# Patient Record
Sex: MALE | Race: WHITE | NOT HISPANIC OR LATINO | Employment: FULL TIME | URBAN - METROPOLITAN AREA
[De-identification: names, ages, dates, MRNs, and addresses within clinical notes are randomized per-mention and may not be internally consistent; named-entity substitution may affect disease eponyms.]

---

## 2023-05-02 ENCOUNTER — OFFICE VISIT (OUTPATIENT)
Dept: URGENT CARE | Facility: CLINIC | Age: 38
End: 2023-05-02

## 2023-05-02 VITALS
DIASTOLIC BLOOD PRESSURE: 80 MMHG | BODY MASS INDEX: 24.5 KG/M2 | OXYGEN SATURATION: 98 % | SYSTOLIC BLOOD PRESSURE: 124 MMHG | HEART RATE: 67 BPM | HEIGHT: 71 IN | TEMPERATURE: 98 F | WEIGHT: 175 LBS

## 2023-05-02 DIAGNOSIS — T15.91XA FOREIGN BODY OF RIGHT EYE, INITIAL ENCOUNTER: Primary | ICD-10-CM

## 2023-05-02 PROCEDURE — 99214 PR OFFICE/OUTPT VISIT, EST, LEVL IV, 30-39 MIN: ICD-10-PCS | Mod: TIER,,, | Performed by: NURSE PRACTITIONER

## 2023-05-02 PROCEDURE — 99214 OFFICE O/P EST MOD 30 MIN: CPT | Mod: TIER,,, | Performed by: NURSE PRACTITIONER

## 2023-05-02 RX ORDER — BUSPIRONE HYDROCHLORIDE 10 MG/1
10 TABLET ORAL 3 TIMES DAILY
COMMUNITY

## 2023-05-02 NOTE — PROGRESS NOTES
"Subjective:      Patient ID: Angelica Aparicio is a 38 y.o. male.    Vitals:  height is 5' 11" (1.803 m) and weight is 79.4 kg (175 lb). His oral temperature is 97.8 °F (36.6 °C). His blood pressure is 124/80 and his pulse is 67. His oxygen saturation is 98%.     Chief Complaint: Eye Pain    This is a 38 y.o. male who presents today with a chief complaint of possible foreign body to eye.     Patient presents with Right Eye Pain x 1 day. Patient states he was grinding on metal and felt something go into his right eye. Pt has been experiencing redness, irritation and foreign body sensation. Pt reports that he was wearing protective eye wear.         Eye Pain   The right eye is affected. This is a new problem. The current episode started yesterday. The problem occurs constantly. The problem has been unchanged. The pain is at a severity of 8/10. The pain is moderate. Associated symptoms include eye redness.     Constitution: Negative.   Eyes:  Positive for foreign body in eye, eye pain and eye redness.    Objective:     Physical Exam   Constitutional: He is oriented to person, place, and time. He appears well-developed.   HENT:   Head: Normocephalic and atraumatic.   Ears:   Right Ear: External ear normal.   Left Ear: External ear normal.   Nose: Nose normal.   Mouth/Throat: Oropharynx is clear and moist.   Eyes: Conjunctivae, EOM and lids are normal. Pupils are equal, round, and reactive to light. No visual field deficit is present. Foreign body (dark colored spec noted to right Iris, at 2 o'clock position. superficial. mild injection noted) present in the right eye. vision grossly intact gaze aligned appropriately   Neck: Trachea normal and phonation normal. Neck supple.   Musculoskeletal: Normal range of motion.         General: Normal range of motion.   Neurological: He is alert and oriented to person, place, and time.   Skin: Skin is warm, dry and intact.   Psychiatric: His speech is normal and behavior is normal. " Judgment and thought content normal.   Nursing note and vitals reviewed.    Assessment:     1. Foreign body of right eye, initial encounter        Plan:       Foreign body of right eye, initial encounter          Medical Decision Making:   Urgent Care Management:  I applied 2 drops of tetracaine eye solution to right.  I cleansed right eye wash with normal saline eye wash.  I attempted to remove the visualized foreign body to iris with a saline soaked gauze with whiskey method.  Attempt was unsuccessful.  I have called [facility] and patient report to this clinic at 3:00 p.m. today for further evaluation and treatment by the eye doctor.       Santos Henderson, ADRIANNE-C

## 2023-05-02 NOTE — PATIENT INSTRUCTIONS
Report directly to eye Doctor appointment today at 3pm at [facility] for further evaluation and treatment.

## 2023-10-05 ENCOUNTER — HOSPITAL ENCOUNTER (EMERGENCY)
Facility: HOSPITAL | Age: 38
Discharge: HOME | End: 2023-10-05
Payer: COMMERCIAL

## 2023-10-05 VITALS
SYSTOLIC BLOOD PRESSURE: 112 MMHG | RESPIRATION RATE: 14 BRPM | TEMPERATURE: 98 F | DIASTOLIC BLOOD PRESSURE: 86 MMHG | OXYGEN SATURATION: 98 % | HEART RATE: 86 BPM | BODY MASS INDEX: 24.36 KG/M2 | WEIGHT: 174 LBS | HEIGHT: 71 IN

## 2023-10-05 DIAGNOSIS — N49.2 SCROTAL WALL ABSCESS: Primary | ICD-10-CM

## 2023-10-05 DIAGNOSIS — R10.32 LEFT GROIN PAIN: ICD-10-CM

## 2023-10-05 PROCEDURE — 99283 EMERGENCY DEPT VISIT LOW MDM: CPT

## 2023-10-05 PROCEDURE — 25000003 PHARM REV CODE 250: Performed by: NURSE PRACTITIONER

## 2023-10-05 RX ORDER — HYDROCODONE BITARTRATE AND ACETAMINOPHEN 10; 325 MG/1; MG/1
1 TABLET ORAL
Status: COMPLETED | OUTPATIENT
Start: 2023-10-05 | End: 2023-10-05

## 2023-10-05 RX ORDER — HYDROCODONE BITARTRATE AND ACETAMINOPHEN 10; 325 MG/1; MG/1
1 TABLET ORAL EVERY 8 HOURS PRN
Qty: 9 TABLET | Refills: 0 | Status: SHIPPED | OUTPATIENT
Start: 2023-10-05

## 2023-10-05 RX ADMIN — HYDROCODONE BITARTRATE AND ACETAMINOPHEN 1 TABLET: 10; 325 TABLET ORAL at 11:10

## 2023-10-05 NOTE — ED PROVIDER NOTES
Encounter Date: 10/5/2023       History     Chief Complaint   Patient presents with    Groin Pain     Reports recent I&D to left testicular region last night at [facility]. Prescribed Bactrim, Mupirocin, and Oxycodone but has not filled. Went to PCP for follow up and was told to come to the ER. Pt reports hx of MRSA     Presents to ED with complaints of left scrotal pain.  States that he has an abscess in that area and had an I and D last night at another emergency room.  He was prescribed Bactrim but has not had this filled he said he was told to follow up with his PCP for pain medicine and when he saw his PCP this morning was told to come straight to the emergency room.      Review of patient's allergies indicates:  No Known Allergies  Past Medical History:   Diagnosis Date    Anxiety      No past surgical history on file.  No family history on file.  Social History     Tobacco Use    Smoking status: Some Days     Types: Cigarettes    Smokeless tobacco: Never     Review of Systems   Constitutional:  Negative for fever.   HENT:  Negative for sore throat.    Respiratory:  Negative for shortness of breath.    Cardiovascular:  Negative for chest pain.   Gastrointestinal:  Negative for nausea.   Genitourinary:  Negative for dysuria.        Left scrotal abscess   Musculoskeletal:  Negative for back pain.   Skin:  Negative for rash.   Neurological:  Negative for weakness.   Hematological:  Does not bruise/bleed easily.       Physical Exam     Initial Vitals [10/05/23 1049]   BP Pulse Resp Temp SpO2   112/86 86 16 97.6 °F (36.4 °C) 98 %      MAP       --         Physical Exam    Nursing note and vitals reviewed.  Constitutional: He appears well-developed and well-nourished.   HENT:   Head: Normocephalic and atraumatic.   Eyes: EOM are normal.   Neck: Neck supple.   Normal range of motion.  Cardiovascular:  Normal rate and regular rhythm.           Pulmonary/Chest: Breath sounds normal. He has no wheezes. He has no  rhonchi.   Genitourinary:    Genitourinary Comments: Telfa bandage in place to left scrotal I&D site. Moderate amount bloody drainage on telfa. I&D site open, tender to palpation. Draining purulent liquid     Musculoskeletal:         General: Normal range of motion.      Cervical back: Normal range of motion and neck supple.     Neurological: He is alert and oriented to person, place, and time.   Skin: Skin is warm and dry. Capillary refill takes less than 2 seconds.   Psychiatric: He has a normal mood and affect. Thought content normal.         ED Course   Procedures  Labs Reviewed - No data to display       Imaging Results    None          Medications   HYDROcodone-acetaminophen  mg per tablet 1 tablet (has no administration in time range)     Medical Decision Making  Amount and/or Complexity of Data Reviewed  External Data Reviewed: notes.     Details: ED notes from [facility] reviewed      Risk  Prescription drug management.               ED Course as of 10/05/23 1136   Thu Oct 05, 2023   1130 Has I&D site left scrotum. Advised to get antibiotics fills. Warm soaks to are tid. Will prescribe # 9 hydrocodone for pain on discharge.  [NP]      ED Course User Index  [NP] Keya Eid FNP                    Clinical Impression:   Final diagnoses:  [N49.2] Scrotal wall abscess (Primary)  [R10.32] Left groin pain        ED Disposition Condition    Discharge Good          ED Prescriptions       Medication Sig Dispense Start Date End Date Auth. Provider    HYDROcodone-acetaminophen (NORCO)  mg per tablet Take 1 tablet by mouth every 8 (eight) hours as needed for Pain. 9 tablet 10/5/2023 -- Keya Eid FNP          Follow-up Information       Follow up With Specialties Details Why Contact Info    [facility]  As needed [address]  [address]  [place] [address]  [phone]               Keya Eid FNP  10/05/23 1132

## 2023-10-05 NOTE — ED NOTES
The patient complains of left sided scrotal pain with onset approximately 1 week. The patient reports he was seen last night at [facility] for this pain. He stated that the provider performed I&D with purulent drainage. The site was bandaged prior to discharge. He stated he was provided with prescriptions for Bactrim, mupirocin, and Oxycodone which he has not gotten filled as yet. He reports increasing pain and went to PCP this morning for follow up and was told to come to the ER. He denies fever, penile discharge or drainage.       GENERAL: The patient weighs approximately 80kg. The patient is alert and interactive, well-nourished and non-toxic in appearance.    HEAD: Normocephalic and visually atraumatic. Pupils are 3mm round/reactive/brisk to light bilaterally with intact direct and consensual reflexes noted. No visible anisocoria is noted. Conjunctivas are pink/moist with no obvious petechiae. Oral mucosa is pink/moist.     NECK: The neck is visually atraumatic. The neck veins are flat and the trachea is palpated in the midline.    CHEST / CV: Visually atraumatic. Symmetrical with equal expansion of the chest wall. S1/S2 with regular rate and rhythm. Peripheral pulses are 2+ with capillary refill time of 2 seconds.     RESPIRATORY: Normal smooth respiratory effort with no obvious distress. No accessory muscle use is noted. There are no retractions noted. Bilateral breath sounds are clear    ABDOMEN: The abdomen is visually atraumatic. Soft and non-tender with normoactive bowel sounds to all quadrants.    : Left testicle is bandaged with Telfa bandage, secured with tape. Scant blood is noted to dressing. The site is clean and free of active purulent drainage or bleeding. The site it tender to touch with induration palpated to the proximal aspect of the scrotum. The site is mildly erythematous.    EXTREMITIES: Visually atraumatic    NEUROLOGIC: Alert/oriented/lucid with appropriate mental status. No  slurred speech is noted. No facial droop is evident.    SKIN: Skin color is consistent with ethnicity. Warm/dry/pink.     Bed placed in lowest position, side rails up x 2. Call light is within reach of pt and instructed on how to use call light with verbalized understanding obtained. Explanation of care provided to pt. Alarms set on monitor for heart rate, pulse ox, and blood pressure.     Plan of Care to include continuous observation and reassurance, explain procedures to pt. Will maintain position of optimal comfort for patient. Awaiting further orders and disposition. Will continue to monitor and follow the plan of care.